# Patient Record
Sex: FEMALE | Race: WHITE | NOT HISPANIC OR LATINO | Employment: OTHER | URBAN - METROPOLITAN AREA
[De-identification: names, ages, dates, MRNs, and addresses within clinical notes are randomized per-mention and may not be internally consistent; named-entity substitution may affect disease eponyms.]

---

## 2017-01-16 RX ORDER — OMEPRAZOLE 20 MG/1
CAPSULE, DELAYED RELEASE ORAL
Qty: 90 CAPSULE | Refills: 5 | Status: SHIPPED | OUTPATIENT
Start: 2017-01-16 | End: 2017-05-15 | Stop reason: SDUPTHER

## 2017-03-09 ENCOUNTER — TELEPHONE (OUTPATIENT)
Dept: FAMILY MEDICINE CLINIC | Facility: CLINIC | Age: 42
End: 2017-03-09

## 2017-03-09 NOTE — TELEPHONE ENCOUNTER
Called pt and she stated she has been out of town for work and the date on her order states that she can have it done by the end of March. Pt states she is planning on getting done next week.

## 2017-03-09 NOTE — TELEPHONE ENCOUNTER
"----- Message from MANUEL Stern sent at 3/8/2017  2:36 PM EST -----  Regarding: FW: Cancellation of Order # [id number]  I ordered a CXR on the 22nd of December and it was cancelled, can you ask if she ever got it.  ----- Message -----     From: Uyen Spears     Sent: 3/8/2017   9:33 AM       To: MANUEL Stern  Subject: Cancellation of Order # [id number]                 Order number [id number] for the procedure XR CHEST PA AND LATERAL   [[id number]] has been canceled by Uyen Spears [[id number]]. This   procedure was ordered by you on Dec 22, 2016 for the patient   Dafne Young [[id number]]. The reason for cancellation was   \"Other\".    This was a future order.    "

## 2017-05-15 ENCOUNTER — TELEPHONE (OUTPATIENT)
Dept: FAMILY MEDICINE CLINIC | Facility: CLINIC | Age: 42
End: 2017-05-15

## 2017-05-16 RX ORDER — OMEPRAZOLE 20 MG/1
20 CAPSULE, DELAYED RELEASE ORAL DAILY
Qty: 90 CAPSULE | Refills: 1 | Status: SHIPPED | OUTPATIENT
Start: 2017-05-16 | End: 2017-08-03 | Stop reason: SDUPTHER

## 2017-05-16 RX ORDER — OMEPRAZOLE 20 MG/1
CAPSULE, DELAYED RELEASE ORAL
Qty: 30 CAPSULE | Refills: 0 | Status: SHIPPED | OUTPATIENT
Start: 2017-05-16 | End: 2017-12-12 | Stop reason: SDUPTHER

## 2017-08-03 ENCOUNTER — OFFICE VISIT (OUTPATIENT)
Dept: FAMILY MEDICINE CLINIC | Facility: CLINIC | Age: 42
End: 2017-08-03

## 2017-08-03 VITALS
BODY MASS INDEX: 25.39 KG/M2 | HEIGHT: 66 IN | HEART RATE: 51 BPM | SYSTOLIC BLOOD PRESSURE: 122 MMHG | OXYGEN SATURATION: 98 % | DIASTOLIC BLOOD PRESSURE: 80 MMHG | WEIGHT: 158 LBS

## 2017-08-03 DIAGNOSIS — M76.51 PATELLAR TENDONITIS OF RIGHT KNEE: Primary | ICD-10-CM

## 2017-08-03 PROCEDURE — 99213 OFFICE O/P EST LOW 20 MIN: CPT | Performed by: NURSE PRACTITIONER

## 2017-08-03 RX ORDER — MELOXICAM 15 MG/1
15 TABLET ORAL DAILY
Qty: 30 TABLET | Refills: 1 | Status: SHIPPED | OUTPATIENT
Start: 2017-08-03 | End: 2018-02-26

## 2017-08-03 NOTE — PROGRESS NOTES
"Dafne Young is a 42 y.o. female.  Seen 08/03/2017    Assessment/Plan   Problem List Items Addressed This Visit     None      Visit Diagnoses     Patellar tendonitis of right knee    -  Primary             No Follow-up on file.  There are no Patient Instructions on file for this visit.    Subjective   Here for rt knee pain when she walks up and down steps and squats.Pain started a year ago but was not as bad. Has cut down on her exercising the last 2 weeks.  Has not taken any medication.No swelling  Involved.  Does not want to get an x-ray she is requesting a plan without any imaging.    Chief Complaint   Patient presents with   • Knee Pain     Social History   Substance Use Topics   • Smoking status: Current Every Day Smoker   • Smokeless tobacco: Never Used   • Alcohol use Yes      Comment: Socially        History of Present Illness     The following portions of the patient's history were reviewed and updated as appropriate:PMHroutine: Social history , Allergies, Current Medications and Active Problem List    Review of Systems   HENT: Positive for sore throat.    Musculoskeletal: Positive for joint swelling. Negative for arthralgias, back pain and gait problem.        Joint pain   Skin: Negative for wound.       Objective   Vitals:    08/03/17 1307   BP: 122/80   Pulse: 51   SpO2: 98%   Weight: 158 lb (71.7 kg)   Height: 66\" (167.6 cm)     Body mass index is 25.5 kg/(m^2).  Physical Exam   Musculoskeletal:        Right knee: She exhibits abnormal meniscus. She exhibits normal range of motion, no swelling, no effusion, no ecchymosis, no deformity, normal alignment and no LCL laxity. No tenderness found.     Reviewed Data:  No results found for any previous visit.    Aleve or ibuprofen as discussed. Minimum of 10 days.  No repetitive movements for the next month. Pauline discussed different type exercises to use in place of the one she is currently doing.  "

## 2017-12-12 ENCOUNTER — TELEPHONE (OUTPATIENT)
Dept: FAMILY MEDICINE CLINIC | Facility: CLINIC | Age: 42
End: 2017-12-12

## 2017-12-12 RX ORDER — OMEPRAZOLE 20 MG/1
20 CAPSULE, DELAYED RELEASE ORAL DAILY
Qty: 90 CAPSULE | Refills: 0 | Status: SHIPPED | OUTPATIENT
Start: 2017-12-12 | End: 2018-02-27 | Stop reason: SDUPTHER

## 2017-12-12 RX ORDER — OMEPRAZOLE 20 MG/1
20 CAPSULE, DELAYED RELEASE ORAL DAILY
Qty: 90 CAPSULE | Refills: 0 | Status: SHIPPED | OUTPATIENT
Start: 2017-12-12 | End: 2017-12-12 | Stop reason: SDUPTHER

## 2017-12-12 NOTE — TELEPHONE ENCOUNTER
Patient contacted and asked to schedule labs with a physical. She states financially she would need to wait until the beginning of next year.

## 2018-02-26 ENCOUNTER — OFFICE VISIT (OUTPATIENT)
Dept: FAMILY MEDICINE CLINIC | Facility: CLINIC | Age: 43
End: 2018-02-26

## 2018-02-26 VITALS
HEART RATE: 80 BPM | BODY MASS INDEX: 25.71 KG/M2 | HEIGHT: 66 IN | DIASTOLIC BLOOD PRESSURE: 80 MMHG | RESPIRATION RATE: 16 BRPM | OXYGEN SATURATION: 98 % | SYSTOLIC BLOOD PRESSURE: 118 MMHG | WEIGHT: 160 LBS

## 2018-02-26 DIAGNOSIS — R20.0 RIGHT FACIAL NUMBNESS: ICD-10-CM

## 2018-02-26 DIAGNOSIS — K21.9 GASTROESOPHAGEAL REFLUX DISEASE WITHOUT ESOPHAGITIS: Primary | ICD-10-CM

## 2018-02-26 LAB
ALBUMIN SERPL-MCNC: 4.3 G/DL (ref 3.5–5.2)
ALBUMIN/GLOB SERPL: 1.5 G/DL
ALP SERPL-CCNC: 84 U/L (ref 39–117)
ALT SERPL-CCNC: 14 U/L (ref 1–33)
AST SERPL-CCNC: 19 U/L (ref 1–32)
BILIRUB SERPL-MCNC: <0.2 MG/DL (ref 0.1–1.2)
BUN SERPL-MCNC: 10 MG/DL (ref 6–20)
BUN/CREAT SERPL: 15.6 (ref 7–25)
CALCIUM SERPL-MCNC: 10.3 MG/DL (ref 8.6–10.5)
CHLORIDE SERPL-SCNC: 102 MMOL/L (ref 98–107)
CO2 SERPL-SCNC: 28.1 MMOL/L (ref 22–29)
CREAT SERPL-MCNC: 0.64 MG/DL (ref 0.57–1)
GFR SERPLBLD CREATININE-BSD FMLA CKD-EPI: 101 ML/MIN/1.73
GFR SERPLBLD CREATININE-BSD FMLA CKD-EPI: 122 ML/MIN/1.73
GLOBULIN SER CALC-MCNC: 2.8 GM/DL
GLUCOSE SERPL-MCNC: 78 MG/DL (ref 65–99)
POTASSIUM SERPL-SCNC: 4 MMOL/L (ref 3.5–5.2)
PROT SERPL-MCNC: 7.1 G/DL (ref 6–8.5)
SODIUM SERPL-SCNC: 143 MMOL/L (ref 136–145)

## 2018-02-26 PROCEDURE — 99213 OFFICE O/P EST LOW 20 MIN: CPT | Performed by: NURSE PRACTITIONER

## 2018-02-26 NOTE — PROGRESS NOTES
Dafne Young is a 42 y.o. female.  H/O Gerd and here for her medication refill and is stable on her medicine.  Also had some left facial numbness secondary to a headache that last a day and then went away, no problems chewing or eating      Assessment/Plan   Problem List Items Addressed This Visit     None      Visit Diagnoses     Gastroesophageal reflux disease without esophagitis    -  Primary    Relevant Orders    Comprehensive Metabolic Panel    Right facial numbness                 No Follow-up on file.  Patient Instructions   Gastroesophageal Reflux Scan  A gastroesophageal reflux scan is a procedure that is used to check for gastroesophageal reflux, which is the backward flow of stomach contents into the tube that carries food from the mouth to the stomach (esophagus). The scan can also show if any stomach contents are inhaled (aspirated) into your lungs. You may need this scan if you have symptoms such as heartburn, vomiting, swallowing problems, or regurgitation. Regurgitation means that swallowed food is returning from the stomach to the esophagus.  For this scan, you will drink a liquid that contains a small amount of a radioactive substance (tracer). A scanner with a camera that detects the radioactive tracer is used to see if any of the material backs up into your esophagus.  Tell a health care provider about:  · Any allergies you have.  · All medicines you are taking, including vitamins, herbs, eye drops, creams, and over-the-counter medicines.  · Any blood disorders you have.  · Any surgeries you have had.  · Any medical conditions you have.  · If you are pregnant or you think that you may be pregnant.  · If you are breastfeeding.  What are the risks?  Generally, this is a safe procedure. However, problems may occur, including:  · Exposure to radiation (a small amount).  · Allergic reaction to the radioactive substance. This is rare.  What happens before the procedure?  · Ask your health care  provider about changing or stopping your regular medicines. This is especially important if you are taking diabetes medicines or blood thinners.  · Follow your health care provider’s instructions about eating or drinking restrictions.  What happens during the procedure?  · You will be asked to drink a liquid that contains a small amount of a radioactive tracer. This liquid will probably be similar to orange juice.  · You will assume a position lying on your back.  · A series of images will be taken of your esophagus and upper stomach.  · You may be asked to move into different positions to help determine if reflux occurs more often when you are in specific positions.  · For adults, an abdominal binder with an inflatable cuff may be placed on the belly (abdomen). This may be used to increase abdominal pressure. More images will be taken to see if the increased pressure causes reflux to occur.  The procedure may vary among health care providers and hospitals.  What happens after the procedure?  · Return to your normal activities and your normal diet as directed by your health care provider.  · The radioactive tracer will leave your body over the next few days. Drink enough fluid to keep your urine clear or pale yellow. This will help to flush the tracer out of your body.  · It is your responsibility to obtain your test results. Ask your health care provider or the department performing the test when and how you will get your results.  This information is not intended to replace advice given to you by your health care provider. Make sure you discuss any questions you have with your health care provider.  Document Released: 02/08/2007 Document Revised: 09/11/2017 Document Reviewed: 09/29/2015  [employer] Interactive Patient Education © 2017 [employer]          Chief Complaint   Patient presents with   • Heartburn   • Headache     Facial Numbness     Social History   Substance Use Topics   • Smoking status: Former  "Smoker   • Smokeless tobacco: Never Used   • Alcohol use Yes      Comment: 8/week       History of Present Illness     The following portions of the patient's history were reviewed and updated as appropriate:PMHroutine: Social history , Allergies, Current Medications and Active Problem List    Review of Systems   Constitutional: Negative for activity change, appetite change and fever.   HENT: Positive for facial swelling. Negative for congestion, dental problem, drooling, ear discharge and ear pain.    Cardiovascular: Negative for chest pain.   Gastrointestinal: Negative for abdominal distention, abdominal pain and constipation.       Objective   Vitals:    02/26/18 0851   BP: 118/80   Pulse: 80   Resp: 16   SpO2: 98%   Weight: 72.6 kg (160 lb)   Height: 167.6 cm (66\")     Body mass index is 25.82 kg/(m^2).  Physical Exam   Constitutional: She appears well-developed and well-nourished. No distress.   HENT:   Head: Normocephalic and atraumatic.   Right Ear: External ear normal.   Left Ear: External ear normal.   Mouth/Throat: Oropharynx is clear and moist.   Eyes: EOM are normal. Pupils are equal, round, and reactive to light.   Cardiovascular: Normal rate and regular rhythm.    Musculoskeletal: Normal range of motion.   Neurological: She is alert.   Skin: Skin is warm.   Nursing note and vitals reviewed.    Reviewed Data:  No results found for any previous visit.  Facial numbness resolved if it returns please call the office.  Will call the medicine in when I get the results of her cmp    "

## 2018-02-26 NOTE — PATIENT INSTRUCTIONS
Gastroesophageal Reflux Scan  A gastroesophageal reflux scan is a procedure that is used to check for gastroesophageal reflux, which is the backward flow of stomach contents into the tube that carries food from the mouth to the stomach (esophagus). The scan can also show if any stomach contents are inhaled (aspirated) into your lungs. You may need this scan if you have symptoms such as heartburn, vomiting, swallowing problems, or regurgitation. Regurgitation means that swallowed food is returning from the stomach to the esophagus.  For this scan, you will drink a liquid that contains a small amount of a radioactive substance (tracer). A scanner with a camera that detects the radioactive tracer is used to see if any of the material backs up into your esophagus.  Tell a health care provider about:  · Any allergies you have.  · All medicines you are taking, including vitamins, herbs, eye drops, creams, and over-the-counter medicines.  · Any blood disorders you have.  · Any surgeries you have had.  · Any medical conditions you have.  · If you are pregnant or you think that you may be pregnant.  · If you are breastfeeding.  What are the risks?  Generally, this is a safe procedure. However, problems may occur, including:  · Exposure to radiation (a small amount).  · Allergic reaction to the radioactive substance. This is rare.  What happens before the procedure?  · Ask your health care provider about changing or stopping your regular medicines. This is especially important if you are taking diabetes medicines or blood thinners.  · Follow your health care provider’s instructions about eating or drinking restrictions.  What happens during the procedure?  · You will be asked to drink a liquid that contains a small amount of a radioactive tracer. This liquid will probably be similar to orange juice.  · You will assume a position lying on your back.  · A series of images will be taken of your esophagus and upper  stomach.  · You may be asked to move into different positions to help determine if reflux occurs more often when you are in specific positions.  · For adults, an abdominal binder with an inflatable cuff may be placed on the belly (abdomen). This may be used to increase abdominal pressure. More images will be taken to see if the increased pressure causes reflux to occur.  The procedure may vary among health care providers and hospitals.  What happens after the procedure?  · Return to your normal activities and your normal diet as directed by your health care provider.  · The radioactive tracer will leave your body over the next few days. Drink enough fluid to keep your urine clear or pale yellow. This will help to flush the tracer out of your body.  · It is your responsibility to obtain your test results. Ask your health care provider or the department performing the test when and how you will get your results.  This information is not intended to replace advice given to you by your health care provider. Make sure you discuss any questions you have with your health care provider.  Document Released: 02/08/2007 Document Revised: 09/11/2017 Document Reviewed: 09/29/2015  [email or web address] Interactive Patient Education © 2017 [employer]

## 2018-02-27 RX ORDER — OMEPRAZOLE 20 MG/1
20 CAPSULE, DELAYED RELEASE ORAL DAILY
Qty: 90 CAPSULE | Refills: 4 | Status: SHIPPED | OUTPATIENT
Start: 2018-02-27 | End: 2019-05-04 | Stop reason: SDUPTHER

## 2019-05-06 RX ORDER — OMEPRAZOLE 20 MG/1
20 CAPSULE, DELAYED RELEASE ORAL DAILY
Qty: 90 CAPSULE | Refills: 1 | Status: SHIPPED | OUTPATIENT
Start: 2019-05-06 | End: 2020-01-20

## 2019-07-29 ENCOUNTER — OFFICE VISIT (OUTPATIENT)
Dept: FAMILY MEDICINE CLINIC | Facility: CLINIC | Age: 44
End: 2019-07-29

## 2019-07-29 VITALS
SYSTOLIC BLOOD PRESSURE: 102 MMHG | RESPIRATION RATE: 16 BRPM | WEIGHT: 151 LBS | DIASTOLIC BLOOD PRESSURE: 68 MMHG | HEART RATE: 82 BPM | OXYGEN SATURATION: 99 % | HEIGHT: 65 IN | BODY MASS INDEX: 25.16 KG/M2

## 2019-07-29 DIAGNOSIS — R10.13 EPIGASTRIC PAIN: Primary | ICD-10-CM

## 2019-07-29 LAB
ALBUMIN SERPL-MCNC: 4.4 G/DL (ref 3.5–5.2)
ALBUMIN/GLOB SERPL: 1.5 G/DL
ALP SERPL-CCNC: 96 U/L (ref 39–117)
ALT SERPL-CCNC: 21 U/L (ref 1–33)
AMYLASE SERPL-CCNC: 46 U/L (ref 28–100)
AST SERPL-CCNC: 26 U/L (ref 1–32)
BILIRUB SERPL-MCNC: 0.2 MG/DL (ref 0.2–1.2)
BUN SERPL-MCNC: 6 MG/DL (ref 6–20)
BUN/CREAT SERPL: 9 (ref 7–25)
CALCIUM SERPL-MCNC: 10.2 MG/DL (ref 8.6–10.5)
CHLORIDE SERPL-SCNC: 104 MMOL/L (ref 98–107)
CO2 SERPL-SCNC: 26.1 MMOL/L (ref 22–29)
CREAT SERPL-MCNC: 0.67 MG/DL (ref 0.57–1)
GGT SERPL-CCNC: 32 U/L (ref 5–36)
GLOBULIN SER CALC-MCNC: 3 GM/DL
GLUCOSE SERPL-MCNC: 80 MG/DL (ref 65–99)
LIPASE SERPL-CCNC: 23 U/L (ref 13–60)
POTASSIUM SERPL-SCNC: 4.5 MMOL/L (ref 3.5–5.2)
PROT SERPL-MCNC: 7.4 G/DL (ref 6–8.5)
SODIUM SERPL-SCNC: 144 MMOL/L (ref 136–145)

## 2019-07-29 PROCEDURE — 99213 OFFICE O/P EST LOW 20 MIN: CPT | Performed by: NURSE PRACTITIONER

## 2019-07-29 NOTE — PROGRESS NOTES
Subjective   Dafne Young is a 44 y.o. female.     History of Present Illness   Pt is here for abdominal pain and bloating . States she is never been normal, but the last month is getting worse, and says some days feels an acute strong pain, that last for a couple hours. Says after episodes, normally has diarrhea for a couple days. States pain, in epigastric region, sometimes feels a little pain in her upper back.     The following portions of the patient's history were reviewed and updated as appropriate: allergies, current medications, past family history, past medical history, past social history, past surgical history and problem list.    Review of Systems   Constitutional: Positive for appetite change. Negative for activity change.   Gastrointestinal: Positive for diarrhea. Negative for nausea.       Objective   Physical Exam   Constitutional: She appears well-developed and well-nourished. No distress.   HENT:   Head: Normocephalic and atraumatic.   Eyes: EOM are normal. Pupils are equal, round, and reactive to light.   Cardiovascular: Normal rate and regular rhythm.   Pulmonary/Chest: Effort normal and breath sounds normal.   Abdominal: Soft. Bowel sounds are normal. She exhibits distension. She exhibits no mass. There is no tenderness. There is no rebound and no guarding. No hernia.   Musculoskeletal: Normal range of motion.   Nursing note and vitals reviewed.      Assessment/Plan   Epigastric pain  Labs orfered  Amylase  Lipase  Gamma ggt  CMP

## 2019-07-29 NOTE — PATIENT INSTRUCTIONS
Gastroesophageal Reflux Disease, Adult  Normally, food travels down the esophagus and stays in the stomach to be digested. If a person has gastroesophageal reflux disease (GERD), food and stomach acid move back up into the esophagus. When this happens, the esophagus becomes sore and swollen (inflamed). Over time, GERD can make small holes (ulcers) in the lining of the esophagus.  Follow these instructions at home:  Diet  · Follow a diet as told by your doctor. You may need to avoid foods and drinks such as:  ? Coffee and tea (with or without caffeine).  ? Drinks that contain alcohol.  ? Energy drinks and sports drinks.  ? Carbonated drinks or sodas.  ? Chocolate and cocoa.  ? Peppermint and mint flavorings.  ? Garlic and onions.  ? Horseradish.  ? Spicy and acidic foods, such as peppers, chili powder, lora powder, vinegar, hot sauces, and BBQ sauce.  ? Citrus fruit juices and citrus fruits, such as oranges, [place], and limes.  ? Tomato-based foods, such as red sauce, chili, salsa, and pizza with red sauce.  ? Fried and fatty foods, such as donuts, french fries, potato chips, and high-fat dressings.  ? High-fat meats, such as hot dogs, rib eye steak, sausage, ham, and daniel.  ? High-fat dairy items, such as whole milk, butter, and cream cheese.  · Eat small meals often. Avoid eating large meals.  · Avoid drinking large amounts of liquid with your meals.  · Avoid eating meals during the 2-3 hours before bedtime.  · Avoid lying down right after you eat.  · Do not exercise right after you eat.  General instructions  · Pay attention to any changes in your symptoms.  · Take over-the-counter and prescription medicines only as told by your doctor. Do not take aspirin, ibuprofen, or other NSAIDs unless your doctor says it is okay.  · Do not use any tobacco products, including cigarettes, chewing tobacco, and e-cigarettes. If you need help quitting, ask your doctor.  · Wear loose clothes. Do not wear anything tight around  your waist.  · Raise (elevate) the head of your bed about 6 inches (15 cm).  · Try to lower your stress. If you need help doing this, ask your doctor.  · If you are overweight, lose an amount of weight that is healthy for you. Ask your doctor about a safe weight loss goal.  · Keep all follow-up visits as told by your doctor. This is important.  Contact a doctor if:  · You have new symptoms.  · You lose weight and you do not know why it is happening.  · You have trouble swallowing, or it hurts to swallow.  · You have wheezing or a cough that keeps happening.  · Your symptoms do not get better with treatment.  · You have a hoarse voice.  Get help right away if:  · You have pain in your arms, neck, jaw, teeth, or back.  · You feel sweaty, dizzy, or light-headed.  · You have chest pain or shortness of breath.  · You throw up (vomit) and your throw up looks like blood or coffee grounds.  · You pass out (faint).  · Your poop (stool) is bloody or black.  · You cannot swallow, drink, or eat.  This information is not intended to replace advice given to you by your health care provider. Make sure you discuss any questions you have with your health care provider.  Document Released: 06/05/2009 Document Revised: 05/25/2017 Document Reviewed: 04/13/2016  [employer] Interactive Patient Education © 2018 [employer]

## 2020-01-20 RX ORDER — OMEPRAZOLE 20 MG/1
20 CAPSULE, DELAYED RELEASE ORAL DAILY
Qty: 90 CAPSULE | Refills: 1 | Status: SHIPPED | OUTPATIENT
Start: 2020-01-20 | End: 2020-07-17

## 2020-07-17 RX ORDER — OMEPRAZOLE 20 MG/1
20 CAPSULE, DELAYED RELEASE ORAL DAILY
Qty: 60 CAPSULE | Refills: 0 | Status: SHIPPED | OUTPATIENT
Start: 2020-07-17 | End: 2020-09-23 | Stop reason: SDUPTHER

## 2020-09-23 DIAGNOSIS — R12 HEART BURN: Primary | ICD-10-CM

## 2020-09-23 RX ORDER — OMEPRAZOLE 20 MG/1
20 CAPSULE, DELAYED RELEASE ORAL DAILY
Qty: 60 CAPSULE | Refills: 0 | Status: SHIPPED | OUTPATIENT
Start: 2020-09-23 | End: 2021-08-18

## 2021-03-04 ENCOUNTER — OFFICE VISIT (OUTPATIENT)
Dept: FAMILY MEDICINE CLINIC | Facility: CLINIC | Age: 46
End: 2021-03-04

## 2021-03-04 VITALS
BODY MASS INDEX: 27.82 KG/M2 | TEMPERATURE: 98.6 F | RESPIRATION RATE: 16 BRPM | HEIGHT: 65 IN | OXYGEN SATURATION: 99 % | SYSTOLIC BLOOD PRESSURE: 120 MMHG | WEIGHT: 167 LBS | HEART RATE: 72 BPM | DIASTOLIC BLOOD PRESSURE: 80 MMHG

## 2021-03-04 DIAGNOSIS — H92.01 RIGHT EAR PAIN: ICD-10-CM

## 2021-03-04 DIAGNOSIS — J06.9 VIRAL UPPER RESPIRATORY TRACT INFECTION: Primary | ICD-10-CM

## 2021-03-04 PROCEDURE — 99213 OFFICE O/P EST LOW 20 MIN: CPT | Performed by: NURSE PRACTITIONER

## 2021-03-04 RX ORDER — CETIRIZINE HYDROCHLORIDE 10 MG/1
10 TABLET ORAL DAILY
Qty: 30 TABLET | Refills: 0
Start: 2021-03-04 | End: 2021-08-18

## 2021-03-04 RX ORDER — FLUTICASONE PROPIONATE 50 MCG
2 SPRAY, SUSPENSION (ML) NASAL DAILY
Qty: 1 G | Refills: 0
Start: 2021-03-04 | End: 2021-08-18

## 2021-03-04 NOTE — PROGRESS NOTES
Raymond Young is a 45 y.o. female.     History of Present Illness   Patient presents with c/o right ear pain that started about 3 days ago. She reports that the pain has gotten worse over the last two days. She reports that she did have a fever of 100.6.  She reports dizziness and mild nasal congestion. She reports that she has taken ibuprofen over the counter. Patient was covid tested two days ago and it was negative.     The following portions of the patient's history were reviewed and updated as appropriate: allergies, current medications, past family history, past medical history, past social history, past surgical history and problem list.    Review of Systems   Constitutional: Positive for fever. Negative for appetite change, chills and fatigue.   HENT: Positive for congestion and ear pain. Negative for postnasal drip, rhinorrhea, sinus pressure, sneezing and sore throat.    Eyes: Negative for redness and itching.   Respiratory: Negative for cough, chest tightness, shortness of breath and wheezing.    Cardiovascular: Negative for chest pain, palpitations and leg swelling.   Musculoskeletal: Positive for myalgias.   Allergic/Immunologic: Negative.    Neurological: Positive for headache. Negative for dizziness.       Objective   Physical Exam  Vitals signs and nursing note reviewed.   Constitutional:       Appearance: She is well-developed.   HENT:      Head: Normocephalic and atraumatic.      Right Ear: Ear canal and external ear normal. A middle ear effusion is present.      Left Ear: Tympanic membrane, ear canal and external ear normal.      Nose: Nose normal.      Mouth/Throat:      Lips: Pink.      Mouth: Mucous membranes are moist.      Tongue: No lesions.      Pharynx: No oropharyngeal exudate.   Eyes:      General:         Right eye: No discharge.         Left eye: No discharge.      Conjunctiva/sclera: Conjunctivae normal.      Pupils: Pupils are equal, round, and reactive to light.    Neck:      Musculoskeletal: Normal range of motion and neck supple.      Thyroid: No thyromegaly.   Cardiovascular:      Rate and Rhythm: Normal rate and regular rhythm.      Heart sounds: Normal heart sounds. No murmur.   Pulmonary:      Effort: Pulmonary effort is normal. No tachypnea or respiratory distress.      Breath sounds: Normal breath sounds. No decreased breath sounds, wheezing or rales.   Lymphadenopathy:      Cervical: No cervical adenopathy.   Skin:     General: Skin is warm and dry.   Neurological:      Mental Status: She is alert and oriented to person, place, and time.   Psychiatric:         Behavior: Behavior normal.         Thought Content: Thought content normal.         Judgment: Judgment normal.         Vitals:    03/04/21 1341   BP: 120/80   Pulse: 72   Resp: 16   Temp: 98.6 °F (37 °C)   SpO2: 99%     Body mass index is 27.82 kg/m².    Procedures    Assessment/Plan   Problems Addressed this Visit     None      Visit Diagnoses     Viral upper respiratory tract infection    -  Primary    Relevant Medications    cetirizine (zyrTEC) 10 MG tablet    fluticasone (Flonase) 50 MCG/ACT nasal spray    Right ear pain        Relevant Medications    cetirizine (zyrTEC) 10 MG tablet    fluticasone (Flonase) 50 MCG/ACT nasal spray      Diagnoses       Codes Comments    Viral upper respiratory tract infection    -  Primary ICD-10-CM: J06.9  ICD-9-CM: 465.9     Right ear pain     ICD-10-CM: H92.01  ICD-9-CM: 388.70         May use Zyrtec over the counter as directed.  May use Flonase over the counter as directed.  Continue ibuprofen over the counter as directed.          Return if symptoms worsen or fail to improve.

## 2021-03-04 NOTE — PATIENT INSTRUCTIONS
"May use Zyrtec over the counter as directed.  May use Flonase over the counter as directed.  Continue ibuprofen over the counter as directed.     Upper Respiratory Infection, Adult  An upper respiratory infection (URI) affects the nose, throat, and upper air passages. URIs are caused by germs (viruses). The most common type of URI is often called \"the common cold.\"  Medicines cannot cure URIs, but you can do things at home to relieve your symptoms. URIs usually get better within 7-10 days.  Follow these instructions at home:  Activity  · Rest as needed.  · If you have a fever, stay home from work or school until your fever is gone, or until your doctor says you may return to work or school.  ? You should stay home until you cannot spread the infection anymore (you are not contagious).  ? Your doctor may have you wear a face mask so you have less risk of spreading the infection.  Relieving symptoms  · Gargle with a salt-water mixture 3-4 times a day or as needed. To make a salt-water mixture, completely dissolve ½-1 tsp of salt in 1 cup of warm water.  · Use a cool-mist humidifier to add moisture to the air. This can help you breathe more easily.  Eating and drinking    · Drink enough fluid to keep your pee (urine) pale yellow.  · Eat soups and other clear broths.  General instructions    · Take over-the-counter and prescription medicines only as told by your doctor. These include cold medicines, fever reducers, and cough suppressants.  · Do not use any products that contain nicotine or tobacco. These include cigarettes and e-cigarettes. If you need help quitting, ask your doctor.  · Avoid being where people are smoking (avoid secondhand smoke).  · Make sure you get regular shots and get the flu shot every year.  · Keep all follow-up visits as told by your doctor. This is important.  How to avoid spreading infection to others    · Wash your hands often with soap and water. If you do not have soap and water, use hand " ".  · Avoid touching your mouth, face, eyes, or nose.  · Cough or sneeze into a tissue or your sleeve or elbow. Do not cough or sneeze into your hand or into the air.  Contact a doctor if:  · You are getting worse, not better.  · You have any of these:  ? A fever.  ? Chills.  ? Brown or red mucus in your nose.  ? Yellow or brown fluid (discharge)coming from your nose.  ? Pain in your face, especially when you bend forward.  ? Swollen neck glands.  ? Pain with swallowing.  ? White areas in the back of your throat.  Get help right away if:  · You have shortness of breath that gets worse.  · You have very bad or constant:  ? Headache.  ? Ear pain.  ? Pain in your forehead, behind your eyes, and over your cheekbones (sinus pain).  ? Chest pain.  · You have long-lasting (chronic) lung disease along with any of these:  ? Wheezing.  ? Long-lasting cough.  ? Coughing up blood.  ? A change in your usual mucus.  · You have a stiff neck.  · You have changes in your:  ? Vision.  ? Hearing.  ? Thinking.  ? Mood.  Summary  · An upper respiratory infection (URI) is caused by a germ called a virus. The most common type of URI is often called \"the common cold.\"  · URIs usually get better within 7-10 days.  · Take over-the-counter and prescription medicines only as told by your doctor.  This information is not intended to replace advice given to you by your health care provider. Make sure you discuss any questions you have with your health care provider.  Document Revised: 12/26/2019 Document Reviewed: 08/10/2018  [employer] Patient Education © 2020 [employer]        "

## 2021-04-02 ENCOUNTER — BULK ORDERING (OUTPATIENT)
Dept: CASE MANAGEMENT | Facility: OTHER | Age: 46
End: 2021-04-02

## 2021-04-02 DIAGNOSIS — Z23 IMMUNIZATION DUE: ICD-10-CM

## 2021-08-18 ENCOUNTER — OFFICE VISIT (OUTPATIENT)
Dept: FAMILY MEDICINE CLINIC | Facility: CLINIC | Age: 46
End: 2021-08-18

## 2021-08-18 VITALS
BODY MASS INDEX: 27.32 KG/M2 | WEIGHT: 164 LBS | OXYGEN SATURATION: 100 % | HEIGHT: 65 IN | HEART RATE: 77 BPM | SYSTOLIC BLOOD PRESSURE: 122 MMHG | RESPIRATION RATE: 14 BRPM | DIASTOLIC BLOOD PRESSURE: 84 MMHG

## 2021-08-18 DIAGNOSIS — R13.10 DYSPHAGIA, UNSPECIFIED TYPE: Primary | ICD-10-CM

## 2021-08-18 PROCEDURE — 99213 OFFICE O/P EST LOW 20 MIN: CPT | Performed by: NURSE PRACTITIONER

## 2021-08-18 NOTE — PROGRESS NOTES
Subjective   Dafne Young is a 46 y.o. female.   Heartburn and trouble swallowing    History of Present Illness   Was eating some steak and it got lodged in her throat.  She attempted to drink water which did not help.  Finally after 4 hours she drank some coke and that did the trick.  She has had GERD for a long time.  She is here today requesting an EGD.    The following portions of the patient's history were reviewed and updated as appropriate: allergies, current medications, past family history, past medical history, past social history, past surgical history and problem list.    Review of Systems   Constitutional: Positive for appetite change. Negative for activity change and fever.   HENT: Positive for trouble swallowing.    Respiratory: Negative for cough and shortness of breath.    Cardiovascular: Negative for chest pain and leg swelling.   Gastrointestinal: Positive for GERD and indigestion.   Skin: Negative for rash.       Objective   Physical Exam  Vitals and nursing note reviewed.   Constitutional:       Appearance: She is well-developed.   HENT:      Head: Normocephalic and atraumatic.   Eyes:      Pupils: Pupils are equal, round, and reactive to light.   Pulmonary:      Effort: Pulmonary effort is normal.   Musculoskeletal:         General: Normal range of motion.   Skin:     General: Skin is warm and dry.   Neurological:      Mental Status: She is alert and oriented to person, place, and time.           Assessment/Plan   Problem List Items Addressed This Visit     None      Visit Diagnoses     Dysphagia, unspecified type    -  Primary    Relevant Orders    Ambulatory Referral to Gastroenterology        Referral placed to GI for possible EGD.  We discussed cutting her food into very small pieces.  If she has an issue with food getting stuck again and has trouble breathing she was instructed to go to the emergency room.       Return if symptoms worsen or fail to improve.

## 2021-08-18 NOTE — PATIENT INSTRUCTIONS
Dysphagia Eating Plan, Bite Size Food  This diet plan is for people with moderate swallowing problems who have transitioned from pureed and minced foods. Bite size foods are soft and cut into small chunks so that they can be swallowed safely. On this eating plan, you may be instructed to drink liquids that are thickened.  Work with your health care provider and your diet and nutrition specialist (dietitian) to make sure that you are following the diet safely and getting all the nutrients you need.  What are tips for following this plan?  General guidelines for foods    · You may eat foods that are tender, soft, and moist.  · Always test food texture before taking a bite. Poke food with a fork or spoon to make sure it is tender.  · Food should be easy to cut and shew. Avoid large pieces of food that require a lot of chewing.  · Take small bites. Each bite should be smaller than your thumb nail (about 15mm by 15 mm).  · If you were on pureed and minced food diet plans, you may eat any of the foods included in those diets.  · Avoid foods that are very dry, hard, sticky, chewy, coarse, or crunchy.  · If instructed by your health care provider, thicken liquids. Follow your health care provider's instructions for what products to use, how to do this, and to what thickness.  ? Your health care provider may recommend using a commercial thickener, rice cereal, or potato flakes. Ask your health care provider to recommend thickeners.  ? Thickened liquids are usually a “pudding-like” consistency, or they may be as thick as honey or thick enough to eat with a spoon.  Cooking  · To moisten foods, you may add liquids while you are blending, mashing, or grinding your foods to the right consistency. These liquids include gravies, sauces, vegetable or fruit juice, milk, half and half, or water.  · Strain extra liquid from foods before eating.  · Reheat foods slowly to prevent a tough crust from forming.  · Prepare foods in  advance.  Meal planning  · Eat a variety of foods to get all the nutrients you need.  · Some foods may be tolerated better than others. Work with your dietitian to identify which foods are safest for you to eat.  · Follow your meal plan as told by your dietitian.  What foods are allowed?  Grains  Moist breads without nuts or seeds. Biscuits, muffins, pancakes, and waffles that are well-moistened with syrup, jelly, margarine, or butter. Cooked cereals. Moist bread stuffing. Moist rice. Well-moistened cold cereal with small chunks. Well-cooked pasta, noodles, rice, and bread dressing in small pieces and thick sauce. Soft dumplings or spaetzle in small pieces and butter or gravy.  Vegetables  Soft, well-cooked vegetables in small pieces. Soft-cooked, mashed potatoes. Thickened vegetable juice.  Fruits  Canned or cooked fruits that are soft or moist and do not have skin or seeds. Fresh, soft bananas. Thickened fruit juices.  Meat and other protein foods  Tender, moist meats or poultry in small pieces. Moist meatballs or meatloaf. Fish without bones. Eggs or egg substitutes in small pieces. Tofu. Tempeh and meat alternatives in small pieces. Well-cooked, tender beans, peas, baked beans, and other legumes.  Dairy  Thickened milk. Cream cheese. Yogurt. Cottage cheese. Sour cream. Small pieces of soft cheese.  Fats and oils  Butter. Oils. Margarine. Mayonnaise. Gravy. Spreads.  Sweets and desserts  Soft, smooth, moist desserts. Pudding. Custard. Moist cakes. Jam. Jelly. Honey. Preserves. Ask your health care provider whether you can have frozen desserts.  Seasoning and other foods  All seasonings and sweeteners. All sauces with small chunks. Prepared tuna, egg, or chicken salad without raw fruits or vegetables. Moist casseroles with small, tender pieces of meat. Soups with tender meat.  What foods are not allowed?  Grains  Coarse or dry cereals. Dry breads. Toast. Crackers. Tough, crusty breads, such as [place] bread and  baguettes. Dry pancakes, waffles, and muffins. Sticky rice. Dry bread stuffing. Granola. Popcorn. Chips.  Vegetables  All raw vegetables. Cooked corn. Rubbery or stiff cooked vegetables. Stringy vegetables, such as celery. Tough, crisp fried potatoes. Potato skins.  Fruits  Hard, crunchy, stringy, high-pulp, and juicy raw fruits such as apples, pineapple, papaya, and watermelon. Small, round fruits, such as grapes. Dried fruit and fruit leather.  Meat and other protein foods  Large pieces of meat. Dry, tough meats, such as daniel, sausage, and hot dogs. Chicken, turkey, or fish with skin and bones. Crunchy peanut butter. Nuts. Seeds. Nut and seed butters.  Dairy  Yogurt with nuts, seeds, or large chunks. Large chunks of cheese. Frozen desserts and milk consistency not allowed by your dietitian.  Sweets and desserts  Dry cakes. Chewy or dry cookies. Any desserts with nuts, seeds, dry fruits, coconut, pineapple, or anything dry, sticky, or hard. Chewy caramel. Licorice. Taffy-type candies. Ask your health care provider whether you can have frozen desserts.  Seasoning and other foods  Soups with tough or large chunks of meats, poultry, or vegetables. Corn or clam chowder. Smoothies with large chunks of fruit.  Summary  · Bite size foods can be helpful for people with moderate swallowing problems.  · On the dysphagia eating plan, you may eat foods that are soft, moist, and cut into pieces smaller than 15mm by 15mm.  · You may be instructed to thicken liquids. Follow your health care provider's instructions about how to do this and to what consistency.  This information is not intended to replace advice given to you by your health care provider. Make sure you discuss any questions you have with your health care provider.  Document Revised: 04/09/2020 Document Reviewed: 03/30/2018  [employer] Patient Education © 2021 [employer]

## 2023-04-26 ENCOUNTER — OFFICE VISIT (OUTPATIENT)
Dept: FAMILY MEDICINE CLINIC | Facility: CLINIC | Age: 48
End: 2023-04-26
Payer: COMMERCIAL

## 2023-04-26 VITALS
HEART RATE: 68 BPM | BODY MASS INDEX: 27.16 KG/M2 | SYSTOLIC BLOOD PRESSURE: 118 MMHG | WEIGHT: 163 LBS | HEIGHT: 65 IN | RESPIRATION RATE: 18 BRPM | DIASTOLIC BLOOD PRESSURE: 78 MMHG | OXYGEN SATURATION: 99 %

## 2023-04-26 DIAGNOSIS — Z86.39 HISTORY OF IRON DEFICIENCY: ICD-10-CM

## 2023-04-26 DIAGNOSIS — N95.1 PERIMENOPAUSE: ICD-10-CM

## 2023-04-26 DIAGNOSIS — Z11.59 ENCOUNTER FOR HEPATITIS C SCREENING TEST FOR LOW RISK PATIENT: ICD-10-CM

## 2023-04-26 DIAGNOSIS — Z13.228 SCREENING FOR METABOLIC DISORDER: ICD-10-CM

## 2023-04-26 DIAGNOSIS — Z00.00 PREVENTATIVE HEALTH CARE: Primary | ICD-10-CM

## 2023-04-26 DIAGNOSIS — Z12.11 COLON CANCER SCREENING: ICD-10-CM

## 2023-04-26 DIAGNOSIS — K21.9 GASTROESOPHAGEAL REFLUX DISEASE WITHOUT ESOPHAGITIS: ICD-10-CM

## 2023-04-26 DIAGNOSIS — Z87.891 FORMER SMOKER: ICD-10-CM

## 2023-04-26 DIAGNOSIS — Z13.220 SCREENING, LIPID: ICD-10-CM

## 2023-04-26 DIAGNOSIS — Z12.31 BREAST CANCER SCREENING BY MAMMOGRAM: ICD-10-CM

## 2023-04-26 DIAGNOSIS — R61 NIGHT SWEATS: ICD-10-CM

## 2023-04-26 DIAGNOSIS — Z80.1 FAMILY HISTORY OF LUNG CANCER: ICD-10-CM

## 2023-04-26 RX ORDER — VENLAFAXINE HYDROCHLORIDE 37.5 MG/1
37.5 CAPSULE, EXTENDED RELEASE ORAL DAILY
Qty: 30 CAPSULE | Refills: 0 | Status: SHIPPED | OUTPATIENT
Start: 2023-04-26 | End: 2023-05-26

## 2023-04-26 NOTE — PROGRESS NOTES
Subjective   Dafne Young is a 48 y.o. female.     History of Present Illness   Estab pt new to this provider, presents today for annual exam, updated labs and screenings.  She has not been in for annual exam for years.  She is not overdue for mammogram, has never had colorectal cancer screening.  She has acute concerns with night sweats and perimenopause symptoms.    Chronic nightsweats X months, has not had period in 8 months.  She does not have a gynecologist.  She is unsure if she is interested in hormone replacement.  She has tried cool environment, breathable sheets, fan in her bedroom, limiting caffeine, without relief.    Patient is overdue for mammogram she is open to having this scheduled  Patient has never had colonoscopy or colorectal cancer screening.  She has no family history of colon cancer, no GI issues.  She is open to at home Cologuard screening    Patient's mother and father both had lung cancer as well as her maternal aunt.  Patient quit smoking 10 years ago, she is interested in low-dose CAT scan for lung screening.  No cough or shortness of breath.    The following portions of the patient's history were reviewed and updated as appropriate: allergies, current medications, past family history, past medical history, past social history, past surgical history and problem list.    Review of Systems   Constitutional: Positive for diaphoresis. Negative for activity change, fatigue, unexpected weight gain and unexpected weight loss.   HENT: Positive for postnasal drip and rhinorrhea. Negative for congestion.         Dental exam is utd     Eyes: Negative for blurred vision and double vision.        Glasses , eye exam is utd      Respiratory: Negative for cough, chest tightness, shortness of breath and wheezing.    Cardiovascular: Negative for chest pain, palpitations and leg swelling.   Gastrointestinal: Positive for GERD. Negative for abdominal pain, blood in stool, constipation and diarrhea.    Endocrine: Negative for cold intolerance, heat intolerance, polydipsia, polyphagia and polyuria.   Genitourinary: Negative for breast lump, breast pain, dysuria, frequency and vaginal discharge.   Musculoskeletal: Negative for arthralgias and back pain.   Skin: Negative for rash.   Allergic/Immunologic: Positive for environmental allergies.   Neurological: Negative for dizziness, seizures, syncope and headache.   Hematological: Bruises/bleeds easily.   Psychiatric/Behavioral: Positive for sleep disturbance, positive for hyperactivity and stress. Negative for suicidal ideas and depressed mood. The patient is not nervous/anxious.        Objective   Physical Exam  Vitals reviewed.   Constitutional:       Appearance: Normal appearance. She is normal weight.   HENT:      Head: Normocephalic.      Right Ear: Tympanic membrane normal.      Left Ear: Tympanic membrane normal.      Nose: Nose normal.      Mouth/Throat:      Mouth: Mucous membranes are moist.   Eyes:      Pupils: Pupils are equal, round, and reactive to light.   Cardiovascular:      Rate and Rhythm: Normal rate and regular rhythm.      Pulses: Normal pulses.      Heart sounds: Normal heart sounds.   Pulmonary:      Effort: Pulmonary effort is normal.      Breath sounds: Normal breath sounds.   Abdominal:      General: Abdomen is flat. Bowel sounds are normal.      Palpations: Abdomen is soft.   Musculoskeletal:         General: Normal range of motion.      Cervical back: Normal range of motion.   Skin:     General: Skin is warm.   Neurological:      General: No focal deficit present.      Mental Status: She is alert.   Psychiatric:         Mood and Affect: Mood is anxious.         Speech: Speech is rapid and pressured.         Behavior: Behavior is hyperactive.         Vitals:    04/26/23 1055   BP: 118/78   Pulse: 68   Resp: 18   SpO2: 99%     Body mass index is 27.12 kg/m².    Procedures    Assessment & Plan   Problems Addressed this Visit         Gastrointestinal Abdominal     GERD (gastroesophageal reflux disease)   Other Visit Diagnoses     Preventative health care    -  Primary    Night sweats        Screening for metabolic disorder        Screening, lipid        Encounter for hepatitis C screening test for low risk patient        Perimenopause        Breast cancer screening by mammogram          Diagnoses       Codes Comments    Preventative health care    -  Primary ICD-10-CM: Z00.00  ICD-9-CM: V70.0     Night sweats     ICD-10-CM: R61  ICD-9-CM: 780.8     Screening for metabolic disorder     ICD-10-CM: Z13.228  ICD-9-CM: V77.99     Screening, lipid     ICD-10-CM: Z13.220  ICD-9-CM: V77.91     Encounter for hepatitis C screening test for low risk patient     ICD-10-CM: Z11.59  ICD-9-CM: V73.89     Gastroesophageal reflux disease without esophagitis     ICD-10-CM: K21.9  ICD-9-CM: 530.81     Perimenopause     ICD-10-CM: N95.1  ICD-9-CM: 627.2     Breast cancer screening by mammogram     ICD-10-CM: Z12.31  ICD-9-CM: V76.12         Orders Placed This Encounter   Procedures   • Mammo Screening Digital Tomosynthesis Bilateral With CAD     Standing Status:   Future     Standing Expiration Date:   4/26/2024     Order Specific Question:   Reason for Exam:     Answer:   screen breast ca     Order Specific Question:   Patient Pregnant     Answer:   No   • Tdap Vaccine Greater Than or Equal To 8yo IM   • Comprehensive Metabolic Panel     Order Specific Question:   Release to patient     Answer:   Routine Release   • Hepatitis C Antibody     Order Specific Question:   Release to patient     Answer:   Routine Release   • Lipid Panel With / Chol / HDL Ratio     Order Specific Question:   Release to patient     Answer:   Routine Release   • TSH     Order Specific Question:   Release to patient     Answer:   Routine Release   • T4, Free     Order Specific Question:   Release to patient     Answer:   Routine Release   • Thyroid Peroxidase Antibody     Order Specific  Question:   Release to patient     Answer:   Routine Release   • Cologuard - Stool, Per Rectum     Standing Status:   Future     Standing Expiration Date:   4/26/2024     Order Specific Question:   Release to patient     Answer:   Routine Release   • Iron and TIBC   • Ferritin   • Ambulatory Referral to Gynecology     Referral Priority:   Routine     Referral Type:   Consultation     Referral Reason:   Specialty Services Required     Requested Specialty:   Gynecology     Number of Visits Requested:   1   • CBC & Differential     Preventative care- Follow heart healthy diet, drink water, walk daily. Wear seatbelts, wear helmets, wear sunscreens. Follow CDC guidelines for covid pandemic.     Cologuard ordered- shared decision making  Mammogram ordered  Low-dose CAT scan ordered    Night sweats-discussed risks versus benefits of medication and options, referred to GYN to discuss hormone replacement and patient is overdue for Pap smear and screening.  Discussed off brand use of venlafaxine for night sweats.  She is interested in trying.  Will Rx 37.5 venlafaxine daily, reviewed side effect profile with patient.  988 or ER for suicidality or worsening symptoms  Encourage cool room, fan, breathable sheets, limiting stressors and stimulants.  Follow heart healthy diet and walk daily.       Additional time on acute concerns and medication management greater than 22 minutes  Education provided in AVS   No follow-ups on file.

## 2023-04-27 LAB
ALBUMIN SERPL-MCNC: 4.7 G/DL (ref 3.5–5.2)
ALBUMIN/GLOB SERPL: 1.7 G/DL
ALP SERPL-CCNC: 99 U/L (ref 39–117)
ALT SERPL-CCNC: 18 U/L (ref 1–33)
AST SERPL-CCNC: 18 U/L (ref 1–32)
BASOPHILS # BLD AUTO: 0.06 10*3/MM3 (ref 0–0.2)
BASOPHILS NFR BLD AUTO: 0.8 % (ref 0–1.5)
BILIRUB SERPL-MCNC: 0.4 MG/DL (ref 0–1.2)
BUN SERPL-MCNC: 7 MG/DL (ref 6–20)
BUN/CREAT SERPL: 10.1 (ref 7–25)
CALCIUM SERPL-MCNC: 10.5 MG/DL (ref 8.6–10.5)
CHLORIDE SERPL-SCNC: 103 MMOL/L (ref 98–107)
CHOLEST SERPL-MCNC: 244 MG/DL (ref 0–200)
CHOLEST/HDLC SERPL: 3.54 {RATIO}
CO2 SERPL-SCNC: 27.1 MMOL/L (ref 22–29)
CREAT SERPL-MCNC: 0.69 MG/DL (ref 0.57–1)
EGFRCR SERPLBLD CKD-EPI 2021: 105.9 ML/MIN/1.73
EOSINOPHIL # BLD AUTO: 0.3 10*3/MM3 (ref 0–0.4)
EOSINOPHIL NFR BLD AUTO: 4.1 % (ref 0.3–6.2)
ERYTHROCYTE [DISTWIDTH] IN BLOOD BY AUTOMATED COUNT: 16.3 % (ref 12.3–15.4)
FERRITIN SERPL-MCNC: 7.64 NG/ML (ref 13–150)
GLOBULIN SER CALC-MCNC: 2.8 GM/DL
GLUCOSE SERPL-MCNC: 82 MG/DL (ref 65–99)
HCT VFR BLD AUTO: 35.2 % (ref 34–46.6)
HCV IGG SERPL QL IA: NON REACTIVE
HDLC SERPL-MCNC: 69 MG/DL (ref 40–60)
HGB BLD-MCNC: 10.5 G/DL (ref 12–15.9)
IMM GRANULOCYTES # BLD AUTO: 0.03 10*3/MM3 (ref 0–0.05)
IMM GRANULOCYTES NFR BLD AUTO: 0.4 % (ref 0–0.5)
IRON SATN MFR SERPL: 8 % (ref 20–50)
IRON SERPL-MCNC: 42 MCG/DL (ref 37–145)
LDLC SERPL CALC-MCNC: 152 MG/DL (ref 0–100)
LYMPHOCYTES # BLD AUTO: 1.76 10*3/MM3 (ref 0.7–3.1)
LYMPHOCYTES NFR BLD AUTO: 24.3 % (ref 19.6–45.3)
MCH RBC QN AUTO: 22.4 PG (ref 26.6–33)
MCHC RBC AUTO-ENTMCNC: 29.8 G/DL (ref 31.5–35.7)
MCV RBC AUTO: 75.1 FL (ref 79–97)
MONOCYTES # BLD AUTO: 0.42 10*3/MM3 (ref 0.1–0.9)
MONOCYTES NFR BLD AUTO: 5.8 % (ref 5–12)
NEUTROPHILS # BLD AUTO: 4.68 10*3/MM3 (ref 1.7–7)
NEUTROPHILS NFR BLD AUTO: 64.6 % (ref 42.7–76)
NRBC BLD AUTO-RTO: 0 /100 WBC (ref 0–0.2)
PLATELET # BLD AUTO: 405 10*3/MM3 (ref 140–450)
POTASSIUM SERPL-SCNC: 4.8 MMOL/L (ref 3.5–5.2)
PROT SERPL-MCNC: 7.5 G/DL (ref 6–8.5)
RBC # BLD AUTO: 4.69 10*6/MM3 (ref 3.77–5.28)
SODIUM SERPL-SCNC: 140 MMOL/L (ref 136–145)
T4 FREE SERPL-MCNC: 1.14 NG/DL (ref 0.93–1.7)
THYROPEROXIDASE AB SERPL-ACNC: 15 IU/ML (ref 0–34)
TIBC SERPL-MCNC: 538 MCG/DL
TRIGL SERPL-MCNC: 133 MG/DL (ref 0–150)
TSH SERPL DL<=0.005 MIU/L-ACNC: 2.95 UIU/ML (ref 0.27–4.2)
UIBC SERPL-MCNC: 496 MCG/DL (ref 112–346)
VLDLC SERPL CALC-MCNC: 23 MG/DL (ref 5–40)
WBC # BLD AUTO: 7.25 10*3/MM3 (ref 3.4–10.8)

## 2023-04-27 RX ORDER — FERROUS SULFATE TAB EC 324 MG (65 MG FE EQUIVALENT) 324 (65 FE) MG
324 TABLET DELAYED RESPONSE ORAL
Qty: 30 TABLET | Refills: 3 | Status: SHIPPED | OUTPATIENT
Start: 2023-04-27 | End: 2023-05-27

## 2023-05-23 RX ORDER — VENLAFAXINE HYDROCHLORIDE 37.5 MG/1
CAPSULE, EXTENDED RELEASE ORAL
Qty: 30 CAPSULE | Refills: 0 | Status: SHIPPED | OUTPATIENT
Start: 2023-05-23

## 2023-06-15 ENCOUNTER — HOSPITAL ENCOUNTER (OUTPATIENT)
Dept: CT IMAGING | Facility: HOSPITAL | Age: 48
Discharge: HOME | End: 2023-06-15
Payer: COMMERCIAL

## 2023-06-15 ENCOUNTER — HOSPITAL ENCOUNTER (OUTPATIENT)
Dept: MAMMOGRAPHY | Facility: HOSPITAL | Age: 48
Discharge: HOME | End: 2023-06-15
Payer: COMMERCIAL

## 2023-06-15 DIAGNOSIS — Z80.1 FAMILY HISTORY OF LUNG CANCER: ICD-10-CM

## 2023-06-15 DIAGNOSIS — Z87.891 FORMER SMOKER: ICD-10-CM

## 2023-06-15 DIAGNOSIS — Z12.31 BREAST CANCER SCREENING BY MAMMOGRAM: ICD-10-CM

## 2023-06-15 PROCEDURE — 77067 SCR MAMMO BI INCL CAD: CPT

## 2023-06-15 PROCEDURE — 77063 BREAST TOMOSYNTHESIS BI: CPT

## 2023-06-15 PROCEDURE — 71271 CT THORAX LUNG CANCER SCR C-: CPT

## 2023-06-19 DIAGNOSIS — R92.8 ABNORMAL MAMMOGRAM OF BOTH BREASTS: Primary | ICD-10-CM

## 2023-06-19 DIAGNOSIS — R91.8 MULTIPLE LUNG NODULES ON CT: ICD-10-CM

## 2023-06-19 DIAGNOSIS — K76.9 LIVER LESION: Primary | ICD-10-CM

## 2023-08-09 ENCOUNTER — HOSPITAL ENCOUNTER (OUTPATIENT)
Dept: CT IMAGING | Facility: HOSPITAL | Age: 48
Discharge: HOME | End: 2023-08-09
Admitting: NURSE PRACTITIONER
Payer: COMMERCIAL

## 2023-08-09 DIAGNOSIS — K76.9 LIVER LESION: ICD-10-CM

## 2023-08-09 PROCEDURE — 74170 CT ABD WO CNTRST FLWD CNTRST: CPT

## 2023-08-09 PROCEDURE — 25510000001 IOPAMIDOL 61 % SOLUTION: Performed by: NURSE PRACTITIONER

## 2023-08-09 RX ADMIN — IOPAMIDOL 85 ML: 612 INJECTION, SOLUTION INTRAVENOUS at 13:00

## 2023-08-10 DIAGNOSIS — K76.9 LIVER LESION: Primary | ICD-10-CM

## 2023-09-06 ENCOUNTER — OFFICE VISIT (OUTPATIENT)
Dept: GASTROENTEROLOGY | Facility: CLINIC | Age: 48
End: 2023-09-06
Payer: COMMERCIAL

## 2023-09-06 VITALS
SYSTOLIC BLOOD PRESSURE: 120 MMHG | DIASTOLIC BLOOD PRESSURE: 84 MMHG | OXYGEN SATURATION: 98 % | WEIGHT: 161.2 LBS | BODY MASS INDEX: 25.91 KG/M2 | HEIGHT: 66 IN | HEART RATE: 75 BPM | TEMPERATURE: 98 F

## 2023-09-06 DIAGNOSIS — D64.9 ANEMIA, UNSPECIFIED TYPE: Primary | ICD-10-CM

## 2023-09-06 DIAGNOSIS — R13.10 DYSPHAGIA, UNSPECIFIED TYPE: ICD-10-CM

## 2023-09-06 DIAGNOSIS — K76.9 LIVER LESION: ICD-10-CM

## 2023-09-06 NOTE — Clinical Note
Can you contact the patient and let her know that I reviewed recent CT with Dr. Douglass our biliary/liver specialist and we would recommend repeat MR for surveillance in 6 months.

## 2023-09-06 NOTE — PROGRESS NOTES
"Chief Complaint   Patient presents with    Difficulty Swallowing       HPI    Dafne Young is a  48 y.o. female here establish care as a new patient for esophageal dysphagia and liver lesion on CT.    Patient will also follow with Dr. Douglass.    Past medical history of GERD and perimenopause.    Reviewed CT of the liver with and without contrast 8/9/2023 - hypodense liver lesions favoring cyst or atypical hemangiomas.  Cholelithiasis.  Small hiatal hernia with thickening of the distal esophagus.  Consider surveillance MRI 3 to 6 months.    On visit today patient reports approximately 1 year of esophageal dysphagia.  Symptoms with solids only.  Worse with dense meat.  She has had to regurgitate on occasion.  Symptoms come and go.  Associated heartburn which improved with over-the-counter omeprazole.  She also tries to avoid eating too close to bedtime.  No nausea, vomiting, poor appetite or weight loss.    She reports a longstanding history of fluctuating bowel pattern at baseline dating back to childhood.  No rectal bleeding or rectal pain.    Recent negative Cologuard.    Incidental finding of anemia on labs in April.  She was prescribed an oral iron supplement but stopped taking approximately 1 month ago due to GI upset.  She has been trying to eat iron rich foods.  Denies heavy menses in fact she has not had a menstrual cycle in over a year.    No family history of colon cancer.  Her mother and aunt had \"non-smoking\" lung cancer.    Past Medical History:   Diagnosis Date    GERD (gastroesophageal reflux disease)     Perimenopausal        Past Surgical History:   Procedure Laterality Date    WISDOM TOOTH EXTRACTION         Scheduled Meds:     Continuous Infusions: No current facility-administered medications for this visit.      PRN Meds:     Allergies   Allergen Reactions    Penicillins Anaphylaxis       Social History     Socioeconomic History    Marital status: Single    Number of children: 0   Tobacco Use "    Smoking status: Former     Packs/day: 1.00     Years: 10.00     Pack years: 10.00     Types: Cigarettes     Quit date: 1/1/2013     Years since quitting: 10.6    Smokeless tobacco: Never   Vaping Use    Vaping Use: Never used   Substance and Sexual Activity    Alcohol use: Yes     Comment: 8/week    Drug use: No    Sexual activity: Not Currently     Partners: Male       Family History   Problem Relation Age of Onset    Lung cancer Mother     Lung cancer Father         smoker    Lung cancer Maternal Aunt     Breast cancer Neg Hx        Review of Systems   HENT:  Positive for trouble swallowing.      Vitals:    09/06/23 1001   BP: 120/84   Pulse: 75   Temp: 98 °F (36.7 °C)   SpO2: 98%       Physical Exam  Constitutional:       Appearance: She is well-developed.   Abdominal:      General: Bowel sounds are normal. There is no distension.      Palpations: Abdomen is soft. There is no mass.      Tenderness: There is no abdominal tenderness. There is no guarding.      Hernia: No hernia is present.   Skin:     General: Skin is warm and dry.      Capillary Refill: Capillary refill takes less than 2 seconds.   Neurological:      Mental Status: She is alert and oriented to person, place, and time.   Psychiatric:         Behavior: Behavior normal.     Assessment    Diagnoses and all orders for this visit:    1. Anemia, unspecified type (Primary)  -     CBC (No Diff)  -     Iron and TIBC  -     Vitamin B12 and Folate  -     Ferritin    2. Dysphagia, unspecified type  -     FL Esophagram Complete; Future    3. Liver lesion  -     MRI abdomen w wo contrast mrcp; Future       Plan    Continue PPI therapy  Follow an antireflux diet  Arrange esophagram for further evaluation of dysphagia  Labs today as above for reassessment of anemia  Reviewed liver lesion findings on recent CT with Dr. Douglass as such would recommend follow-up MR for surveillance in 6 months  Further recommendations and follow-up pending the aforementioned  work-up         MANUEL Oh  [facility]  [address] [place] [address]  Office: [phone]

## 2023-09-07 LAB
ERYTHROCYTE [DISTWIDTH] IN BLOOD BY AUTOMATED COUNT: 16.1 % (ref 12.3–15.4)
FERRITIN SERPL-MCNC: 8.52 NG/ML (ref 13–150)
FOLATE SERPL-MCNC: 7.74 NG/ML (ref 4.78–24.2)
HCT VFR BLD AUTO: 33.6 % (ref 34–46.6)
HGB BLD-MCNC: 10.4 G/DL (ref 12–15.9)
IRON SATN MFR SERPL: 11 % (ref 20–50)
IRON SERPL-MCNC: 51 MCG/DL (ref 37–145)
MCH RBC QN AUTO: 23.4 PG (ref 26.6–33)
MCHC RBC AUTO-ENTMCNC: 31 G/DL (ref 31.5–35.7)
MCV RBC AUTO: 75.7 FL (ref 79–97)
PLATELET # BLD AUTO: 359 10*3/MM3 (ref 140–450)
RBC # BLD AUTO: 4.44 10*6/MM3 (ref 3.77–5.28)
TIBC SERPL-MCNC: 469 MCG/DL
UIBC SERPL-MCNC: 418 MCG/DL (ref 112–346)
VIT B12 SERPL-MCNC: 393 PG/ML (ref 211–946)
WBC # BLD AUTO: 8.24 10*3/MM3 (ref 3.4–10.8)

## 2023-09-07 NOTE — PROGRESS NOTES
Please inform the patient that her lab work still shows anemia stable at 10.4.  Normal B12 and folate.  Her iron is normal but her iron saturation is low.  Does she think she could tolerate a multivitamin with iron if so would have her start that?  Await imaging

## 2023-09-11 ENCOUNTER — TELEPHONE (OUTPATIENT)
Dept: GASTROENTEROLOGY | Facility: CLINIC | Age: 48
End: 2023-09-11
Payer: COMMERCIAL

## 2023-11-17 ENCOUNTER — OFFICE VISIT (OUTPATIENT)
Dept: OBSTETRICS AND GYNECOLOGY | Age: 48
End: 2023-11-17
Payer: COMMERCIAL

## 2023-11-17 VITALS
SYSTOLIC BLOOD PRESSURE: 120 MMHG | HEIGHT: 66 IN | WEIGHT: 156 LBS | BODY MASS INDEX: 25.07 KG/M2 | DIASTOLIC BLOOD PRESSURE: 72 MMHG

## 2023-11-17 DIAGNOSIS — Z12.4 SCREENING FOR CERVICAL CANCER: ICD-10-CM

## 2023-11-17 DIAGNOSIS — Z01.419 WELL WOMAN EXAM WITH ROUTINE GYNECOLOGICAL EXAM: Primary | ICD-10-CM

## 2023-11-17 DIAGNOSIS — N95.1 MENOPAUSAL SYMPTOMS: ICD-10-CM

## 2023-11-17 RX ORDER — FERROUS GLUCONATE 324(38)MG
324 TABLET ORAL
COMMUNITY

## 2023-11-17 NOTE — PROGRESS NOTES
Subjective     Chief Complaint   Patient presents with    Gynecologic Exam     New GYN, annual, unknown last pap , mg 06/15/2023, cologard 2023  CC:  night sweats, menopause sx       History of Present Illness    Dafne Young is a 48 y.o.  who presents for annual exam.    New GYN  Due for annual exam and pap smear  She also has chief complaint of menopausal symptoms - HF/NS's. Having HF's daily.   She is UTD on mammogram and colon cancer screening  LMP - a little over a year ago  She tried effexor via PCP and did help briefly but had side effects and stopped helping with HF's  She states over the last month or so the HF's have improved so she wants to wait through the end of the year before starting HRT - she will let me know  She has significant family hx of lung cancer (non smoking related). Pt quit smoking when her mother was dx with lung cancer 10 years ago. She states genetic testing was negative. They are thinking it was an environmental cause. She is getting yearly screening  She is not SA, declines std screening  Soc -  for [employer] locally  No other GYN concerns or complaints today    Obstetric History:  OB History          0    Para   0    Term   0            AB        Living             SAB        IAB        Ectopic        Molar        Multiple        Live Births   0               Menstrual History:     Patient's last menstrual period was 2016.         Current contraception: post menopausal status  History of abnormal Pap smear: no  Received Gardasil immunization: no  Perform regular self breast exam: yes - .  Family history of uterine or ovarian cancer: no  Family History of colon cancer: no  Family history of breast cancer: no    Mammogram: up to date.  Colonoscopy: up to date.  DEXA: not indicated.    Exercise: moderately active  Calcium/Vitamin D: adequate intake    The following portions of the patient's history were reviewed and updated as  appropriate: allergies, current medications, past family history, past medical history, past social history, past surgical history, and problem list.    Review of Systems   Constitutional: Negative.    Respiratory: Negative.     Cardiovascular: Negative.    Gastrointestinal: Negative.    Endocrine: Positive for heat intolerance.   Genitourinary: Negative.    Skin: Negative.    Psychiatric/Behavioral: Negative.             Objective   Physical Exam  Constitutional:       General: She is awake.      Appearance: Normal appearance. She is well-developed.   HENT:      Head: Normocephalic and atraumatic.      Nose: Nose normal.   Neck:      Thyroid: No thyroid mass, thyromegaly or thyroid tenderness.   Cardiovascular:      Rate and Rhythm: Normal rate and regular rhythm.      Pulses: Normal pulses.      Heart sounds: Normal heart sounds.   Pulmonary:      Effort: Pulmonary effort is normal.      Breath sounds: Normal breath sounds.   Chest:   Breasts:     Breasts are symmetrical.      Right: Normal. No swelling, bleeding, inverted nipple, mass, nipple discharge, skin change or tenderness.      Left: Normal. No swelling, bleeding, inverted nipple, mass, nipple discharge, skin change or tenderness.   Abdominal:      General: Abdomen is flat. Bowel sounds are normal.      Palpations: Abdomen is soft.      Tenderness: There is no abdominal tenderness.   Genitourinary:     General: Normal vulva.      Labia:         Right: No rash, tenderness, lesion or injury.         Left: No rash, tenderness, lesion or injury.       Urethra: No prolapse, urethral pain, urethral swelling or urethral lesion.      Vagina: Normal. No signs of injury. No vaginal discharge, erythema, tenderness, bleeding, lesions or prolapsed vaginal walls.      Cervix: Normal. No discharge, friability, lesion, erythema or cervical bleeding.      Uterus: Normal. Not enlarged, not tender and no uterine prolapse.       Adnexa: Right adnexa normal and left adnexa  "normal.        Right: No mass, tenderness or fullness.          Left: No mass, tenderness or fullness.        Rectum: Normal. No mass.   Musculoskeletal:      Cervical back: Normal range of motion and neck supple.   Lymphadenopathy:      Upper Body:      Right upper body: No supraclavicular adenopathy.      Left upper body: No supraclavicular adenopathy.   Skin:     General: Skin is warm and dry.   Neurological:      General: No focal deficit present.      Mental Status: She is alert and oriented to person, place, and time.   Psychiatric:         Mood and Affect: Mood normal.         Behavior: Behavior normal. Behavior is cooperative.         Thought Content: Thought content normal.         Judgment: Judgment normal.         /72   Ht 167.6 cm (66\")   Wt 70.8 kg (156 lb)   LMP 12/19/2016   BMI 25.18 kg/m²     Assessment & Plan   Diagnoses and all orders for this visit:    1. Well woman exam with routine gynecological exam (Primary)    2. Screening for cervical cancer  -     IGP, Apt HPV,rfx 16 / 18,45    3. Menopausal symptoms        All questions answered.  Breast self exam technique reviewed and patient encouraged to perform self-exam monthly.  Discussed healthy lifestyle modifications.  Recommended 30 minutes of aerobic exercise five times per week.  Discussed calcium needs to prevent osteoporosis.    -Pap done  -Discussed treatment options for HRT. Discussed use r/b/a, side effects. She wants to think about it and see how her hot flashes go through the end of the year and will let me know if she wants to start something.  -F/u 1 year for annual exam                "

## 2023-11-22 LAB
CYTOLOGIST CVX/VAG CYTO: NORMAL
CYTOLOGY CVX/VAG DOC CYTO: NORMAL
CYTOLOGY CVX/VAG DOC THIN PREP: NORMAL
DX ICD CODE: NORMAL
HIV 1 & 2 AB SER-IMP: NORMAL
HPV I/H RISK 4 DNA CVX QL PROBE+SIG AMP: NEGATIVE
OTHER STN SPEC: NORMAL
STAT OF ADQ CVX/VAG CYTO-IMP: NORMAL

## 2024-01-01 NOTE — TELEPHONE ENCOUNTER
----- Message from MANUEL Oh sent at 9/8/2023 11:35 AM EDT -----  Can you contact the patient and let her know that I reviewed recent CT with Dr. Douglass our biliary/liver specialist and we would recommend repeat MR for surveillance in 6 months.  
Left message advising as per Claritza's note. Advised to call back with questions.   
-'s hands and feet may be bluish in color for a few days.

## 2025-01-22 ENCOUNTER — OFFICE VISIT (OUTPATIENT)
Dept: OBSTETRICS AND GYNECOLOGY | Age: 50
End: 2025-01-22
Payer: COMMERCIAL

## 2025-01-22 VITALS
HEIGHT: 66 IN | BODY MASS INDEX: 23.78 KG/M2 | SYSTOLIC BLOOD PRESSURE: 114 MMHG | DIASTOLIC BLOOD PRESSURE: 68 MMHG | WEIGHT: 148 LBS

## 2025-01-22 DIAGNOSIS — Z12.31 ENCOUNTER FOR SCREENING MAMMOGRAM FOR MALIGNANT NEOPLASM OF BREAST: ICD-10-CM

## 2025-01-22 DIAGNOSIS — Z01.419 WELL WOMAN EXAM WITH ROUTINE GYNECOLOGICAL EXAM: Primary | ICD-10-CM

## 2025-01-22 NOTE — PROGRESS NOTES
Subjective     Chief Complaint   Patient presents with    Gynecologic Exam     Annual, last pap 2023 neg/hpv neg, mg 06/15/2023, cologuard , no dexa  CC:  no problems       History of Present Illness    Dafne Young is a 49 y.o.  who presents for annual exam.    Doing well  She is postmenopausal - no vaginal bleeding or HRT. Still has some HF's but tolerable. Declines intervention.  Cologuard UTD  Mammo is due.  Pap UTD  No other GYN concerns or complaints  PCP - Delia JACKSON    Obstetric History:  OB History          0    Para   0    Term   0            AB        Living             SAB        IAB        Ectopic        Molar        Multiple        Live Births   0               Menstrual History:     Patient's last menstrual period was 2016.         Current contraception: post menopausal status  History of abnormal Pap smear: no  Received Gardasil immunization: no  Perform regular self breast exam: yes - .  Family history of uterine or ovarian cancer: no  Family History of colon cancer: no  Family history of breast cancer: no    Mammogram: ordered.  Colonoscopy: up to date.  DEXA: not indicated.    Exercise: moderately active  Calcium/Vitamin D: adequate intake    The following portions of the patient's history were reviewed and updated as appropriate: allergies, current medications, past family history, past medical history, past social history, past surgical history, and problem list.    Review of Systems   Constitutional: Negative.    Respiratory: Negative.     Cardiovascular: Negative.    Gastrointestinal: Negative.    Genitourinary: Negative.    Skin: Negative.    Psychiatric/Behavioral: Negative.             Objective   Physical Exam  Constitutional:       General: She is awake.      Appearance: Normal appearance. She is well-developed.   HENT:      Head: Normocephalic and atraumatic.      Nose: Nose normal.   Neck:      Thyroid: No thyroid mass, thyromegaly or  thyroid tenderness.   Cardiovascular:      Rate and Rhythm: Normal rate and regular rhythm.      Pulses: Normal pulses.      Heart sounds: Normal heart sounds.   Pulmonary:      Effort: Pulmonary effort is normal.      Breath sounds: Normal breath sounds.   Chest:   Breasts:     Breasts are symmetrical.      Right: Normal. No swelling, bleeding, inverted nipple, mass, nipple discharge, skin change or tenderness.      Left: Normal. No swelling, bleeding, inverted nipple, mass, nipple discharge, skin change or tenderness.   Abdominal:      General: Abdomen is flat. Bowel sounds are normal.      Palpations: Abdomen is soft.      Tenderness: There is no abdominal tenderness.   Genitourinary:     General: Normal vulva.      Labia:         Right: No rash, tenderness, lesion or injury.         Left: No rash, tenderness, lesion or injury.       Urethra: No prolapse, urethral pain, urethral swelling or urethral lesion.      Vagina: Normal. No signs of injury. No vaginal discharge, erythema, tenderness, bleeding, lesions or prolapsed vaginal walls.      Cervix: Normal. No discharge, friability, lesion, erythema or cervical bleeding.      Uterus: Normal. Not enlarged, not tender and no uterine prolapse.       Adnexa: Right adnexa normal and left adnexa normal.        Right: No mass, tenderness or fullness.          Left: No mass, tenderness or fullness.        Rectum: Normal. No mass.   Musculoskeletal:      Cervical back: Normal range of motion and neck supple.   Lymphadenopathy:      Upper Body:      Right upper body: No supraclavicular adenopathy.      Left upper body: No supraclavicular adenopathy.   Skin:     General: Skin is warm and dry.   Neurological:      General: No focal deficit present.      Mental Status: She is alert and oriented to person, place, and time.   Psychiatric:         Mood and Affect: Mood normal.         Behavior: Behavior normal. Behavior is cooperative.         Thought Content: Thought content  "normal.         Judgment: Judgment normal.         /68   Ht 167.6 cm (66\")   Wt 67.1 kg (148 lb)   LMP 12/19/2016   BMI 23.89 kg/m²     Assessment & Plan   Diagnoses and all orders for this visit:    1. Well woman exam with routine gynecological exam (Primary)    2. Encounter for screening mammogram for malignant neoplasm of breast  -     Mammo Screening Digital Tomosynthesis Bilateral With CAD; Future        All questions answered.  Breast self exam technique reviewed and patient encouraged to perform self-exam monthly.  Discussed healthy lifestyle modifications.  Recommended 30 minutes of aerobic exercise five times per week.  Discussed calcium needs to prevent osteoporosis.    -Pap UTD  -Mammo ordered - pt states last time it was $600 for screening mammo. She is going to check with insurance. May try to go somewhere cheaper if going to be that much.  -F/u 1 year for annual exam, sooner prn                "

## 2025-06-24 ENCOUNTER — HOSPITAL ENCOUNTER (OUTPATIENT)
Facility: HOSPITAL | Age: 50
Discharge: HOME | End: 2025-06-24
Admitting: NURSE PRACTITIONER
Payer: COMMERCIAL

## 2025-06-24 DIAGNOSIS — Z12.31 ENCOUNTER FOR SCREENING MAMMOGRAM FOR MALIGNANT NEOPLASM OF BREAST: ICD-10-CM

## 2025-06-24 PROCEDURE — 77063 BREAST TOMOSYNTHESIS BI: CPT

## 2025-06-24 PROCEDURE — 77067 SCR MAMMO BI INCL CAD: CPT
